# Patient Record
Sex: FEMALE | Race: WHITE
[De-identification: names, ages, dates, MRNs, and addresses within clinical notes are randomized per-mention and may not be internally consistent; named-entity substitution may affect disease eponyms.]

---

## 2017-04-03 ENCOUNTER — HOSPITAL ENCOUNTER (OUTPATIENT)
Dept: HOSPITAL 84 - D.MAMMO | Age: 77
Discharge: HOME | End: 2017-04-03
Attending: FAMILY MEDICINE
Payer: MEDICARE

## 2017-04-03 DIAGNOSIS — Z12.31: Primary | ICD-10-CM

## 2018-04-02 ENCOUNTER — HOSPITAL ENCOUNTER (OUTPATIENT)
Dept: HOSPITAL 84 - D.RAD | Age: 78
Discharge: HOME | End: 2018-04-02
Attending: FAMILY MEDICINE
Payer: MEDICARE

## 2018-04-02 DIAGNOSIS — R13.10: Primary | ICD-10-CM

## 2020-05-28 ENCOUNTER — HOSPITAL ENCOUNTER (OUTPATIENT)
Dept: HOSPITAL 84 - D.HCCECHO | Age: 80
Discharge: HOME | End: 2020-05-28
Attending: INTERNAL MEDICINE
Payer: MEDICARE

## 2020-05-28 DIAGNOSIS — R06.09: Primary | ICD-10-CM

## 2020-06-16 ENCOUNTER — HOSPITAL ENCOUNTER (OUTPATIENT)
Dept: HOSPITAL 84 - D.CATH | Age: 80
Discharge: HOME | End: 2020-06-16
Attending: INTERNAL MEDICINE
Payer: MEDICARE

## 2020-06-16 VITALS — DIASTOLIC BLOOD PRESSURE: 64 MMHG | SYSTOLIC BLOOD PRESSURE: 149 MMHG

## 2020-06-16 VITALS
HEIGHT: 65 IN | WEIGHT: 196.41 LBS | HEIGHT: 65 IN | WEIGHT: 196.41 LBS | BODY MASS INDEX: 32.72 KG/M2 | BODY MASS INDEX: 32.72 KG/M2

## 2020-06-16 DIAGNOSIS — Z82.49: ICD-10-CM

## 2020-06-16 DIAGNOSIS — E11.9: ICD-10-CM

## 2020-06-16 DIAGNOSIS — R94.39: ICD-10-CM

## 2020-06-16 DIAGNOSIS — I10: ICD-10-CM

## 2020-06-16 DIAGNOSIS — E78.5: ICD-10-CM

## 2020-06-16 DIAGNOSIS — R06.09: ICD-10-CM

## 2020-06-16 DIAGNOSIS — I25.119: Primary | ICD-10-CM

## 2020-06-16 LAB
ALT SERPL-CCNC: 27 U/L (ref 10–68)
ANION GAP SERPL CALC-SCNC: 9.3 MMOL/L (ref 8–16)
BASOPHILS NFR BLD AUTO: 0.2 % (ref 0–2)
BUN SERPL-MCNC: 13 MG/DL (ref 7–18)
CALCIUM SERPL-MCNC: 8.7 MG/DL (ref 8.5–10.1)
CHLORIDE SERPL-SCNC: 106 MMOL/L (ref 98–107)
CHOLEST/HDLC SERPL: 2.3 RATIO (ref 2.3–4.1)
CO2 SERPL-SCNC: 29.9 MMOL/L (ref 21–32)
CREAT SERPL-MCNC: 1.1 MG/DL (ref 0.6–1.3)
EOSINOPHIL NFR BLD: 2 % (ref 0–7)
ERYTHROCYTE [DISTWIDTH] IN BLOOD BY AUTOMATED COUNT: 13.4 % (ref 11.5–14.5)
GLUCOSE SERPL-MCNC: 123 MG/DL (ref 74–106)
HCT VFR BLD CALC: 39.9 % (ref 36–48)
HDLC SERPL-MCNC: 54 MG/DL (ref 32–96)
HGB BLD-MCNC: 12.9 G/DL (ref 12–16)
IMM GRANULOCYTES NFR BLD: 0.2 % (ref 0–5)
LDL-HDL RATIO: 1 RATIO (ref 1.5–3.5)
LDLC SERPL-MCNC: 53 MG/DL (ref 0–100)
LYMPHOCYTES NFR BLD AUTO: 37.4 % (ref 15–50)
MCH RBC QN AUTO: 29.3 PG (ref 26–34)
MCHC RBC AUTO-ENTMCNC: 32.3 G/DL (ref 31–37)
MCV RBC: 90.7 FL (ref 80–100)
MONOCYTES NFR BLD: 10.5 % (ref 2–11)
NEUTROPHILS NFR BLD AUTO: 49.7 % (ref 40–80)
OSMOLALITY SERPL CALC.SUM OF ELEC: 281 MOSM/KG (ref 275–300)
PLATELET # BLD: 295 10X3/UL (ref 130–400)
PMV BLD AUTO: 11.1 FL (ref 7.4–10.4)
POTASSIUM SERPL-SCNC: 4.2 MMOL/L (ref 3.5–5.1)
RBC # BLD AUTO: 4.4 10X6/UL (ref 4–5.4)
SODIUM SERPL-SCNC: 141 MMOL/L (ref 136–145)
TRIGL SERPL-MCNC: 78 MG/DL (ref 30–200)
WBC # BLD AUTO: 4.6 10X3/UL (ref 4.8–10.8)

## 2020-06-16 NOTE — NUR
RIGHT GROIN DRESSING C/D/I. NO S/S OF HEMATOMA NOTED. PIV D/C'D WITH
CATH TIP INTACT. TOLERATED WELL. PT INSTRUCTED TO GET UP AND DRESSED
AT THIS TIME. FRIEND AT BEDSIDE TO ASSIST.

## 2020-06-16 NOTE — NUR
RIGHT GROIN DRESSING C/D/I. NO S/S OF HEMATOMA NOTED. HEAD OF BED INC
TO 30 DEGREES. TOLERATED WELL. VSS. SET UP WITH SANDWICH TRAY AND
DRINK. DENIES NAUSEA/PAIN. CALL LIGHT WITHIN REACH. FRIEND AT
BEDSIDE.

## 2020-06-16 NOTE — NUR
PT RESTING COMFORTABLY. RIGHT GROIN DRESSING C/D/I. NO S/S OF
HEMATOMA NOTED. CALL LIGHT WITHIN REACH. FRIEND AT BEDSIDE. NO NEEDS
AT THIS TIME. DENIES NAUSEA/PAIN.

## 2020-06-16 NOTE — NUR
DISCUSSED DISCHARGE INSTRUCTIONS WITH PT. SHE VOICED UNDERSTANDING.
RIGHT GROIN DRESSING C/D/I. NO S/S OF HEMATOMA NOTED.

## 2020-06-16 NOTE — HEMODYNAMI
PATIENT:PARAS TATE                                MEDICAL RECORD: [id number]
: 38                                            LOCATION:[facility]          
ACCT# [id number]                                       ADMISSION DATE: 20
 
 
 Generatedon:20209:21
Patient name: PARAS TATE Patient #: [id number] Visit #: [id number] SSN: [id number]
[id number] :
1940 Date of study: 2020
Page: Of
Hemodynamic Procedure Report
****************************
Patient Data
Patient Demographics
Procedure consent was obtained
First Name: PARAS          Gender: Female
Last Name: BEBETO            : 1940
Middle Initial: ROBLES        Age: 80 year(s)
Patient #: [id number]       Race: 
Visit #: [id number]
SSN: [id number]
Accession #:
[id number]
Additional ID: [id number]
Contact details
Address: [address] [place] [address] Phone: [phone]
State: [place]
City: [facility]
Zip code: [address]
Past Medical History
Allergies
Allergen        Reaction        Date         Comments
Reported
Other allergy                   2020    SULFA
Admission
Admission Data
Admission Date: 2020   Admission Time: 6:21
Arrival Date: 2020     Arrival Time: 8:30
Admit Source: Other         Insurance Payor: Medicare
HIC #: [id number]
Height (in.): 65            BSA: 1.97 (m2)
Height (cm.): 165.1         BMI: 33.11 (kg/m2)
Weight (lbs.): 199
Weight (kg.): 90.26
Lab Results
Lab Result Date: 2020  Lab Result Time: 0:00
Biochemistry
Name         Units    Result                Min      Max
BUN          mg/dl    13       --(--*-)--   7        18
Creatinine   mg/dl    1.1      --(--*-)--   0.6      1.3
eGFR         ml/min   50       *-(----)--   90       120
NONAFRICAN
CBC
Name         Units    Result                Min      Max
Hematocrit   %        39.9     -*(----)--   42       54
Hemoglobin   g/dl     12.9     -*(----)--   13.5     17.5
Procedure
Procedure Types
Cath Procedure
 
Diagnostic Procedure
[facility] w/Coronaries
Sedation Charges
Moderate Sedation up to 15 minutes
Procedure Description
Procedure Date
Procedure Date: 2020
Procedure Start Time: 8:55
Procedure End Time: 9:08
Procedure Staff
Name                            Function
Annmarie Pineda RT                    Scrub
Ken Fairchild MD                   Performing Physician
Vera Juarez RT                Monitor
Indication
Angina
Procedure Data
Cath Procedure
Fluoroscopy
Diagnostic fluoroscopy      Total fluoroscopy Time: 1.5
time: 1.5 min               min
Diagnostic fluoroscopy      Total fluoroscopy dose: 344
dose: 344 mGy               mGy
Contrast Material
Contrast Material Type                       Amount (ml)
Isovue 370                                   55
Entry Location
Entry     Primary  Successful  Side  Size  Upsize Upsize Entry    Closure Succes
sful  Closure
Location                             (Fr)  1 (Fr) 2 (Fr) Remarks  Device        
      Remarks
Femoral                        Right 5 Fr                         Exoseal
artery
Estimated blood loss: 5 ml
Diagnostic catheters
Device Type               Used For           End Catheter
Placement
MULTIPACK JL 4.0 5Fr      Procedure
catheter
MULTIPACK 3DRC 5Fr        Procedure
catheter
MULTIPACK Pigtail 5 Fr    Procedure
catheter
Procedure Complications
No complications
Procedure Medications
Medication           Administration Route Dosage
Oxygen               etCO2 Nasal cannula  2 l/min
Heparin Flush Bag    added to field       2 bags
(1000units/500ml NS)
Lidocaine 2%         added to field       20
0.9% NaCl            I.V.                 100 ml/hr
Fentanyl             I.V.                 25 mcg
Versed               I.V.                 0.5 mg
Hemodynamics
Rest
BSA: 1.97 (m2) HGB: 12.9 (g/dl) O2 Consumption: Estimated: 187.93 (ml/min) O2 Co
nsumption
indexed: Estimated:95.4 (ml/min/m) Heart Rate: 85 (bpm)
 
Pressure Samples
Time     Site     Value (mmHg) Purpose      Heart      Use
Rate(bpm)
9:03     LV       195/57,71    Snapshot     73
Gradients
Valve  Time  Site Site Mean    SEP/DFP    Peak To Heart  Use
1    2    (mmHg)  (sec/min)  Peak    Rate
(mmHg)  (bpm)
Aortic 9:04  LV   AO                              80
Snapshots
Pre Cath      Intra         NCS           Post Cath
Vital Signs
Time    Heart  Resp   SPO2 etCO2   NIBP (mmHg) Rhythm  Pain    Sedation
Rate   (ipm)  (%)  (mmHg)                      Status  Level
(bpm)
8:45:48 72     14     96   0       131/62(98)  NSR     0 (11)  10(A)
, No
pain
8:50:03 72     15     99   36.3    127/62(93)  NSR     0 (11)  9(A)
, No
pain
8:54:17 77     16     100  39.2    131/67(110) NSR     0 (11)  9(A)
, No
pain
8:58:31 71     18     100  38.5    123/71(103) NSR     0 (11)  9(A)
, No
pain
9:03:34 80     13     100  37.8    141/72(110) NSR     0 (11)  9(A)
, No
pain
9:07:49 73     10     100  33.2    145/82(103) NSR     0 (11)  10(A)
, No
pain
Medications
Time    Medication       Route   Dose  Verified Delivered Reason     Notes  Effe
ctiveness
by       by
8:32:06 Oxygen           etCO2   2     Donna    Donna     Per
Nasal   l/min Waqas Alan   physician
cannula       RN       RN
8:32:14 Heparin Flush    added   2     Donna    Donna     used for
Bag              to      bags  Waqas Alan   procedure
(1000units/500ml field         RN       RN
NS)
8:32:24 Lidocaine 2%     added   20ml  Donna    Ken      for local
to      vial  Waqas Fairchild MD  anesthetic
field         RN
8:32:45 0.9% NaCl        I.V.    100   Donna    Donna     for local
ml/hr Waqas Alan   anesthetic
RN       RN
8:49:21 Fentanyl         I.V.    25    Ken     Donna     for
mcg   Gurinder Alan   sedation
RN
8:49:29 Versed           I.V.    0.5   Ken     Donna     for
mg    Gurinder Alan   sedation
RN
Procedure Log
Time     Note
7:51:26  Arrival Date: 2020 8:30:00 AM
7:51:52  Admit Source: Other
 
7:51:57  Insurance Payor : Medicare
7:52:51  Patient Height : 65 inches
7:52:58  Patient Weight : 199 lbs
7:53:09  Diagnostic Cath Status : Elective
7:59:10  Indication : Angina
7:59:16  Procedure Status Elective Heart Cath (OP).
8:18:26  Time tracking: Regular hours (M-F 7:00 - 5:00)
8:18:26  Donna Alan RN sent for patient. Start room use.
8:18:30  Plan of Care:Hemodynamics will remain stable., Cardiac
rhythm will remain stable., Comfort level will be
maintained., Respiratory function will remain
adequate., Patient/ family verbilizes understanding of
procedure., Procedure tolerated without complication.,
Recovers from procedure without complications..
8:23:55  Patient received from Pre/Post Procedure Room to CCL 2
Alert and oriented. Tansferred to table in Supine
position.
8:23:57  Warm blankets applied, and deven hugger turned on for
patient comfort.
8:23:58  Signed procedure consent form obtained from patient.
8:23:59  Correct patient and procedure confirmed by team.
8:24:00  ECG and BP/O2 sat monitors applied to patient.
8:31:22  Vital chart was started
8::23  Full Disclosure recording started
8:31:24  Baseline sample Acquired.
8:31:32  H&P Date Dictated: 2020 Within 30 days and on
chart..
8:31:33  Pre-op teaching completed and patient verbalized
understanding.
8:31:33  Pre-procedure instructions explained to patient.
8:31:36  Family in waiting room.
8:31:38  Patient NPO since Midnight.
8:31:53  Patient allergic to Other allergySULFA
8::59  Is the patient allergic to Iodine/contrast media? No.
8:32:00  Was the patient premedicated? No
8:32:01  Is patient on blood thinner?No
8:32:04  Patient diabetic? Yes.
8:32:06  Oxygen 2 l/min etCO2 Nasal cannula was administered by
Donna Alan RN; Per physician; Verbal order read
back and verified.
8:32:14  Heparin Flush Bag (1000units/500ml NS) 2 bags added to
field was administered by Donna Alan RN; used for
procedure; Verbal order read back and verified.
8:32:24  Lidocaine 2% 20ml vial added to field was administered
by Ken Fairchild MD; for local anesthetic; Verbal order
read back and verified.
8:32:45  0.9% NaCl 100 ml/hr I.V. was administered by Donna Alan RN; for local anesthetic; Verbal order read
back and verified.
8:32:57  If diabetic: On Metformin? No
8:32:59  Patient not pregnant. Patient is over age 55.
8:33:00  ----Pre-sedation anethsthesia assessment.----
8:33:04  Previous problem with sedation/anesthesia? No ?
8:33:05  Snore? Yes
8:33:06  Sleep apnea? No
8:33:07  Deviated septum? No
8:33:08  Opens mouth fully? Yes
8:33:09  Sticks out tongue? Yes
8:33:12  Airway obstruction? No ?
8:33:14  Dentures? No ?
 
8:33:20  Modified Everton's test Ulnar > 7 seconds.
8:33:27  IV patent on arrival in left antecubital with 0.9%
NaCl at KVO.
8:33:46  Stress Test: yes; abnormal INFERIOR AND APICAL
8:33:51  Right groin area was prepped with chlora-prep and
draped in sterile fashion
8:33:52  Alarms reviewed by R. N.
8:33:53  Sharps counted by scrub and verified by R.N.
8:34:02  Rhythm: sinus rhythm
8:34:53  Use device set Femoral Dx
8:34:54  ACIST Syringe (42514) opened to sterile field.
8:34:55  Medline Cath Pack ([id number]) opened to sterile field.
8:34:55  Bag Decanter (2002S) opened to sterile field.
8:34:56  ACIST Hand Control (89379) opened to sterile field.
8:34:57  ACIST Manifold (88582) opened to sterile field.
8:34:58  DIAGNOSTIC Multipack 5Fr catheter set (II6622) opened
to sterile field.
8:34:59  EMERALD Guide Wire (555-441) opened to sterile field.
8:34:59  SHEATH 5FR [place] (EXJ170) opened to sterile field.
8:39:50  Lab results completed and on chart.
8:46:38  Final Timeout: patient, procedure, and site verified
with staff and physician. All members of the team are
in agreement.
8:46:38  --------ALL STOP TIME OUT------
8:46:40  Right groin site verified by team.
8:46:43  Fire Safety Assessment: A--An alcohol-based skin
anteseptic being used preoperatively., C--Open oxygen
or nitrous oxide is being used., D--An ESU, laser, or
fiber-optic light is being used.
8:46:46  Physical assessment completed. ASA score P 2 - A
patient with mild systemic disease as per Ken Fairchild MD.
8:46:49  3a) 45-59 Moderately reduced kidney function.
8:47:14  Maximum allowable contrast dose (3.7 X eGFR X 0.75)139
ml.
8:47:18  Sedation plan: IV Moderate Sedation Medication:Versed,
Fentanyl
8:49:21  Fentanyl 25 mcg I.V. was administered by Donna Alan
RN; for sedation; Verbal order read back and verified.
8:49:29  Versed 0.5 mg I.V. was administered by Donna Alan
RN; for sedation; Verbal order read back and verified.
8:50:08  Lab Result : eGFR NONAFRICAN 50 ml/min
8:50:08  Lab Result : Creatinine 1.1 mg/dl
8:50:08  Lab Result : BUN 13 mg/dl
8:54:55  Procedure started.
8:55:27  Lab Result : Hematocrit 39.9 %
8:55:27  Lab Result : Hemoglobin 12.9 g/dl
8:55:37  Local anesthetic to right femoral artery with
Lidocaine 2% by Ken Fairchild MD.**INITIAL ACCESS ONLY**
8:57:14  A 5 Fr sheath was inserted into the Right Femoral
artery
8:58:02  A MULTIPACK JL 4.0 5Fr catheter was advanced over the
wire and used for Procedure.
8:59:01  LCA angiography performed.
8:59:04  Injector settings: Ml/sec: 3, Volume: 6,
9:00:42  Catheter exchanged over wire.
9:01:15  A MULTIPACK 3DRC 5Fr catheter was advanced over the
wire and used for Procedure.
9:02:04  RCA angiography performed.
9:02:08  Injector settings: Ml/sec: 3, Volume: 6,
 
9:02:23  **ACC**Dominant side:Left
9:02:28  Catheter exchanged over wire.
9:03:21  A MULTIPACK Pigtail 5 Fr catheter was advanced over
the wire and used for Procedure.
9:03:31  LV gram done using BOND
9:03:59  LV hemodynamics recorded.
9:04:03  Injector settings: Ml/sec: 5, Volume: 15,
9:04:19  EF : 50 %
9:04:38  Catheter removed.
9:04:50  EXOSEAL 5Fr () opened to sterile field.
9:05:39  Sheath removed intact; hemostasis achieved with
Exoseal to the Right Femoral artery.
9:05:44  Fluoroscopy time 01.50 minutes.
9:05:48  Fluoroscopy dose: 344 mGy
9:05:48  Flurop Dose total: 344
9:05:52  Dose Area Product 17776 mGy/cm.
9:05:53  Procedure ended.(Physican Out)
9:06:11  Contrast amount:Isovue 370 55ml.
9:06:14  Maximum allowable dose exceeded? No.
9:06:15  Sharps counted by scrub and verified by R.N.
9:06:20  Post-op/insertion site Right Femoral artery dressed
using a 4 x 4 and Tegaderm.
9:06:24  Post right femoral artery:stable, soft, clean and dry
9:06:26  Post Procedure Pulses reassessed and unchanged
9:06:30  Post-procedure physical assessment completed. ASA
score P 2 - A patient with mild systemic disease as
per Ken Fairchild MD.
9:06:37  Post procedure rhythm: unchanged.
9:06:40  Estimated blood loss: 5 ml
9:06:41  Post procedure instruction explained to
patient.Patient verbalizes understanding.
9:06:42  Patient needs reinforcement of post procedure
teaching.
9:06:47  Procedure type changed to Cath procedure, Diagnostic
procedure, LHC, C w/Coronaries, Sedation Charges,
Moderate Sedation up to 15 minutes
9:07:03  Procedure and supply charges have been captured,
reviewed, submitted and are correct.
9:07:07  Procedure Complication : No complications
9:07:09  Vital chart was stopped
9:07:11  [facility] Findings: mild to moderate CAD (<70%)
9:07:15  Operative report dictated upon procedure completion.
9:07:16  See physician's report for complete and final results.
9:07:17  Report given to Pre/Post Procedure Room.
9:07:21  Patient transfered to Pre/Post Procedure Room with
Stretcher.
9:08:50  Full Disclosure recording stopped
9:08:50  Procedure ended.
9:09:39  End room use (Document Last)
Device Usage
Item Name   Manufacture  Quantity  Catalog    Hospital Part    Current Minimal L
ot# /
Number     Charge   Number  Stock   Stock   Serial#
Code
ACIST       Acist        1         [id number]      [id number]   [id number]  [id number]  20
Syringe     amiando
(05157)     Systems Inc
Bag         Microtek     1               674848   08033   556912  5
Decanter    [employer]
()
 
Medline     Medline      1         [id number]  [id number]   56405   986766  5
Cath Pack
([id number])
ACIST Hand  Acist        1         44896      570694   016687  323172  5
Control     Medical
(66639)     Systems Inc
ACIST       Acist        1         35134      353228   361732  806679  5
Manifold    Medical
(41048)     Systems Inc
DIAGNOSTIC  Cardinal     1         [id number]     099715   12844   169045  30
Multipack   Health
5Fr
catheter
set
([id number])
SHEATH 5FR  Terumo       1         [id number]     737788   828973  100781  5
[place]
(UDG907)
EMERALD     Cardinal     1         502-609    919148   658227  025742  5
Guide Wire  Health
(502-863)
MULTIPACK   Cardinal     1                                     149588  5
JL 4.0 5Fr  Health
catheter
MULTIPACK   Cardinal     1                                     374637  5
3DRC 5Fr    Health
catheter
MULTIPACK   Cardinal     1                                     249617  5
Pigtail 5   Health
Fr catheter
EXOSEAL 5Fr Cardinal     1               020837   520088  550891  10
()     Health
Signature Audit [place]
Stage           Time        Signature      Unsigned
Intra-Procedure 2020   Vera Juarez
9:09:50 AM  RT(R)
Intra-Procedure 2020   Ken Gilliland RN
9:13:47 AM                 2020 9:17:40
AM
Intra-Procedure 2020   Ken Fairchild MD
9:20:55 AM
Signatures
Performing Physician :  Signature :
Ken Fairchild MD           ______________________________
Date : ______________ Time :
______________
Monitor : Vera Juarez  Signature :
RT                       ______________________________
Date : ______________ Time :
______________
 
 
 
 
 
[facility]                                          
[address]                           
[place] [address]

## 2020-06-16 NOTE — NUR
PT RESTING COMFORTABLY. VSS. RIGHT GROIN DRESSING C/D/I. NO S/S OF
HEMATOMA NOTED. CALL LIGHT WITHIN REACH. VSS AT THIS TIME. FRIEND AT
BEDSIDE.

## 2020-06-16 NOTE — NUR
PT ARRIVED BY STRETCHER. PLACED ON MONITORS. ASSESSMENT COMPLETED.
VSS AT THIS TIME. CALL LIGHT WITHIN REACH. FRIEND AT BEDSIDE.

## 2020-06-16 NOTE — NUR
PT TAKEN DOWN TO VEHICLE BY WHEELCHAIR. NO S/S OF DISTRESS NOTED. ALL
BELONGINGS AND PAPERWORK IN HAND.